# Patient Record
Sex: MALE | Race: WHITE
[De-identification: names, ages, dates, MRNs, and addresses within clinical notes are randomized per-mention and may not be internally consistent; named-entity substitution may affect disease eponyms.]

---

## 2017-08-04 ENCOUNTER — HOSPITAL ENCOUNTER (EMERGENCY)
Dept: HOSPITAL 17 - PHED | Age: 67
Discharge: HOME | End: 2017-08-04
Payer: COMMERCIAL

## 2017-08-04 VITALS — BODY MASS INDEX: 22.63 KG/M2 | HEIGHT: 72 IN | WEIGHT: 167.11 LBS

## 2017-08-04 VITALS
TEMPERATURE: 98.2 F | OXYGEN SATURATION: 95 % | SYSTOLIC BLOOD PRESSURE: 161 MMHG | HEART RATE: 103 BPM | RESPIRATION RATE: 16 BRPM | DIASTOLIC BLOOD PRESSURE: 79 MMHG

## 2017-08-04 VITALS — SYSTOLIC BLOOD PRESSURE: 120 MMHG | DIASTOLIC BLOOD PRESSURE: 78 MMHG

## 2017-08-04 DIAGNOSIS — Z85.9: ICD-10-CM

## 2017-08-04 DIAGNOSIS — K62.5: Primary | ICD-10-CM

## 2017-08-04 DIAGNOSIS — I10: ICD-10-CM

## 2017-08-04 LAB
APTT BLD: 34.1 SEC (ref 24.3–30.1)
BASOPHILS # BLD AUTO: 0.3 TH/MM3 (ref 0–0.2)
BASOPHILS NFR BLD: 2.9 % (ref 0–2)
EOSINOPHIL # BLD: 0.2 TH/MM3 (ref 0–0.4)
EOSINOPHIL NFR BLD: 1.3 % (ref 0–4)
ERYTHROCYTE [DISTWIDTH] IN BLOOD BY AUTOMATED COUNT: 13 % (ref 11.6–17.2)
HCT VFR BLD CALC: 45.4 % (ref 39–51)
HEMO FLAGS: (no result)
INR PPP: 1 RATIO
LYMPHOCYTES # BLD AUTO: 1.3 TH/MM3 (ref 1–4.8)
LYMPHOCYTES NFR BLD AUTO: 10.7 % (ref 9–44)
MCH RBC QN AUTO: 31.9 PG (ref 27–34)
MCHC RBC AUTO-ENTMCNC: 33.4 % (ref 32–36)
MCV RBC AUTO: 95.3 FL (ref 80–100)
MONOCYTES NFR BLD: 6.7 % (ref 0–8)
NEUTROPHILS # BLD AUTO: 9.2 TH/MM3 (ref 1.8–7.7)
NEUTROPHILS NFR BLD AUTO: 78.4 % (ref 16–70)
PLATELET # BLD: 200 TH/MM3 (ref 150–450)
PROTHROMBIN TIME: 11 SEC (ref 9.8–11.6)
RBC # BLD AUTO: 4.76 MIL/MM3 (ref 4.5–5.9)
WBC # BLD AUTO: 11.8 TH/MM3 (ref 4–11)

## 2017-08-04 PROCEDURE — 85025 COMPLETE CBC W/AUTO DIFF WBC: CPT

## 2017-08-04 PROCEDURE — 99283 EMERGENCY DEPT VISIT LOW MDM: CPT

## 2017-08-04 PROCEDURE — 85610 PROTHROMBIN TIME: CPT

## 2017-08-04 PROCEDURE — 80307 DRUG TEST PRSMV CHEM ANLYZR: CPT

## 2017-08-04 PROCEDURE — 85730 THROMBOPLASTIN TIME PARTIAL: CPT

## 2017-08-04 NOTE — PD
HPI


Chief Complaint:  GI Complaint


Time Seen by Provider:  13:58


Travel History


International Travel<30 days:  No


Contact w/Intl Traveler<30days:  No


Traveled to known affect area:  No





History of Present Illness


HPI


This patient complains of rectal bleeding.  Duration 2 days.  Severity is mild.

  He describes a red liquid bleeding.  No melena.  He has no prior history of 

GI bleed.  Never had a colonoscopy.  Up until recently he was alcoholic 

drinking heavily.  No alleviating factors.





PFSH


Past Medical History


Autoimmune Disease:  No


Cancer:  Yes


Chemotherapy:  No


Hypertension:  Yes


Psychiatric:  No


Reproductive:  Yes (PENILE PUMP IMPLANT)


Radiation Therapy:  No





Past Surgical History


AICD:  No


Body Medical Devices:  PENILE IMPLANT


Genitourinary Surgery:  Yes


Pacemaker:  No


Other Surgery:  Yes (PROSTATE CA HX/REMOVAL  AND KIDNEY TUBE REPAIR)





Social History


Alcohol Use:  No


Tobacco Use:  No


Substance Use:  No





Allergies-Medications


(Allergen,Severity, Reaction):  


Coded Allergies:  


     Codeine (Verified  Allergy, Severe, rash, 8/4/17)


Reported Meds & Prescriptions





Reported Meds & Active Scripts


Active


Active Prescriptions or Reported Medications Unobtainable    








Review of Systems


General / Constitutional:  No: Fever


Eyes:  No: Visual changes


HENT:  No: Headaches


Cardiovascular:  No: Chest Pain or Discomfort


Respiratory:  No: Shortness of Breath


Gastrointestinal:  Positive: Hematochezia,  No: Abdominal Pain


Genitourinary:  No: Dysuria


Musculoskeletal:  No: Pain


Skin:  No Rash


Neurologic:  No: Weakness


Psychiatric:  No: Depression


Endocrine:  No: Polydipsia


Hematologic/Lymphatic:  No: Easy Bruising





Physical Exam


Narrative


GENERAL: Well-nourished, well-developed patient in no apparent distress.  He is 

very hard of hearing


SKIN: Focused skin assessment reveals no rash and nodules. Skin is Warm and dry.


HEAD: Atraumatic. Normocephalic. 


EYES: Pupils equal and round. No scleral icterus. No injection or drainage. 


ENT: No nasal bleeding or discharge.  Mucous membranes pink and moist.


NECK: Trachea midline. No JVD. 


CARDIOVASCULAR: Regular rate and rhythm.  No murmur appreciated.


RESPIRATORY: No accessory muscle use. Clear to auscultation. Breath sounds 

equal bilaterally. 


GASTROINTESTINAL: Abdomen soft, non-tender, nondistended. Hepatic and splenic 

margins not palpable. 


MUSCULOSKELETAL: No obvious deformities. No clubbing.  No cyanosis.  No edema. 


NEUROLOGICAL: Awake and alert. No obvious cranial nerve deficits.  Motor 

grossly within normal limits. Normal speech.


PSYCHIATRIC: Appropriate mood and affect; insight and judgment normal.


Rectal: No fissure or external hemorrhoid





Data


Data


Last Documented VS





Vital Signs








  Date Time  Temp Pulse Resp B/P Pulse Ox O2 Delivery O2 Flow Rate FiO2


 


8/4/17 13:38 98.2 103 16 161/79 95   








Orders








 Iv Access Insert/Monitor (8/4/17 14:11)


Complete Blood Count With Diff (8/4/17 14:11)


Prothrombin Time / Inr (Pt) (8/4/17 14:11)


Act Partial Throm Time (Ptt) (8/4/17 14:11)


Alcohol (Ethanol) (8/4/17 14:11)








Labs








 Laboratory Tests








Test 8/4/17





 14:32


 


White Blood Count 11.8 TH/MM3


 


Red Blood Count 4.76 MIL/MM3


 


Hemoglobin 15.2 GM/DL


 


Hematocrit 45.4 %


 


Mean Corpuscular Volume 95.3 FL


 


Mean Corpuscular Hemoglobin 31.9 PG


 


Mean Corpuscular Hemoglobin 33.4 %





Concent 


 


Red Cell Distribution Width 13.0 %


 


Platelet Count 200 TH/MM3


 


Mean Platelet Volume 8.4 FL


 


Neutrophils (%) (Auto) 78.4 %


 


Lymphocytes (%) (Auto) 10.7 %


 


Monocytes (%) (Auto) 6.7 %


 


Eosinophils (%) (Auto) 1.3 %


 


Basophils (%) (Auto) 2.9 %


 


Neutrophils # (Auto) 9.2 TH/MM3


 


Lymphocytes # (Auto) 1.3 TH/MM3


 


Monocytes # (Auto) 0.8 TH/MM3


 


Eosinophils # (Auto) 0.2 TH/MM3


 


Basophils # (Auto) 0.3 TH/MM3


 


CBC Comment DIFF FINAL 


 


Differential Comment  


 


Prothrombin Time 11.0 SEC


 


Prothromb Time International 1.0 RATIO





Ratio 


 


Activated Partial 34.1 SEC





Thromboplast Time 


 


Ethyl Alcohol Level 0 MG/DL














MDM


Medical Decision Making


Medical Screen Exam Complete:  Yes


Emergency Medical Condition:  Yes


Medical Record Reviewed:  Yes


Differential Diagnosis


Internal hemorrhoid, AV malformation, diverticulosis


Narrative Course


I have reviewed the patient's electronic medical record.  Has been seen here 

before with no prior blood work on file





IV placed


CBC is normal with hemoglobin of 15.2


Coagulation studies are normal


Alcohol level is negative





Patient is having rectal bleeding but stable.  He is not hypotensive and has a 

healthy hemoglobin of 15.2


He is minimally symptomatic





Recommend he follow-up with GI physician to discuss colonoscopy


If he clinically worsens he will return


Advised to avoid alcohol and aspirin and anti-inflammatories





Diagnosis





 Primary Impression:  


 Rectal bleeding





***Additional Instructions:


Follow-up with  GI physician


Avoid aspirin and anti-inflammatories and alcohol


Return for significant worsening


***Med/Other Pt SpecificInfo:  Other


Scripts


Unable to Obtain Active Prescriptions or Reported Meds


Disposition:  01 DISCHARGE HOME


Condition:  Stable








Adarsh Chou MD Aug 4, 2017 14:14

## 2017-08-23 ENCOUNTER — HOSPITAL ENCOUNTER (EMERGENCY)
Dept: HOSPITAL 17 - PHED | Age: 67
Discharge: HOME | End: 2017-08-23
Payer: COMMERCIAL

## 2017-08-23 VITALS
RESPIRATION RATE: 16 BRPM | HEART RATE: 81 BPM | DIASTOLIC BLOOD PRESSURE: 78 MMHG | OXYGEN SATURATION: 98 % | SYSTOLIC BLOOD PRESSURE: 114 MMHG

## 2017-08-23 VITALS
TEMPERATURE: 97.4 F | SYSTOLIC BLOOD PRESSURE: 153 MMHG | HEART RATE: 89 BPM | DIASTOLIC BLOOD PRESSURE: 70 MMHG | RESPIRATION RATE: 18 BRPM | OXYGEN SATURATION: 99 %

## 2017-08-23 VITALS
HEART RATE: 80 BPM | SYSTOLIC BLOOD PRESSURE: 139 MMHG | RESPIRATION RATE: 16 BRPM | DIASTOLIC BLOOD PRESSURE: 82 MMHG | OXYGEN SATURATION: 98 %

## 2017-08-23 VITALS — OXYGEN SATURATION: 98 % | RESPIRATION RATE: 16 BRPM

## 2017-08-23 VITALS — HEIGHT: 71 IN | WEIGHT: 160.94 LBS | BODY MASS INDEX: 22.53 KG/M2

## 2017-08-23 DIAGNOSIS — I10: ICD-10-CM

## 2017-08-23 DIAGNOSIS — Z96.0: ICD-10-CM

## 2017-08-23 DIAGNOSIS — K52.9: ICD-10-CM

## 2017-08-23 DIAGNOSIS — Z85.46: ICD-10-CM

## 2017-08-23 DIAGNOSIS — K62.5: Primary | ICD-10-CM

## 2017-08-23 LAB
ANION GAP SERPL CALC-SCNC: 11 MEQ/L (ref 5–15)
BASOPHILS # BLD AUTO: 0.2 TH/MM3 (ref 0–0.2)
BASOPHILS NFR BLD: 1.3 % (ref 0–2)
BUN SERPL-MCNC: 17 MG/DL (ref 7–18)
CHLORIDE SERPL-SCNC: 108 MEQ/L (ref 98–107)
EOSINOPHIL # BLD: 0.3 TH/MM3 (ref 0–0.4)
EOSINOPHIL NFR BLD: 1.5 % (ref 0–4)
ERYTHROCYTE [DISTWIDTH] IN BLOOD BY AUTOMATED COUNT: 14.4 % (ref 11.6–17.2)
GFR SERPLBLD BASED ON 1.73 SQ M-ARVRAT: 96 ML/MIN (ref 89–?)
HCO3 BLD-SCNC: 22.2 MEQ/L (ref 21–32)
HCT VFR BLD CALC: 48.3 % (ref 39–51)
HEMO FLAGS: (no result)
LYMPHOCYTES # BLD AUTO: 1.5 TH/MM3 (ref 1–4.8)
LYMPHOCYTES NFR BLD AUTO: 8.1 % (ref 9–44)
MCH RBC QN AUTO: 31.7 PG (ref 27–34)
MCHC RBC AUTO-ENTMCNC: 33 % (ref 32–36)
MCV RBC AUTO: 95.9 FL (ref 80–100)
MONOCYTES NFR BLD: 6.8 % (ref 0–8)
NEUTROPHILS # BLD AUTO: 14.8 TH/MM3 (ref 1.8–7.7)
NEUTROPHILS NFR BLD AUTO: 82.3 % (ref 16–70)
PLATELET # BLD: 164 TH/MM3 (ref 150–450)
POTASSIUM SERPL-SCNC: 3.6 MEQ/L (ref 3.5–5.1)
RBC # BLD AUTO: 5.03 MIL/MM3 (ref 4.5–5.9)
SODIUM SERPL-SCNC: 141 MEQ/L (ref 136–145)
WBC # BLD AUTO: 18 TH/MM3 (ref 4–11)

## 2017-08-23 PROCEDURE — 80048 BASIC METABOLIC PNL TOTAL CA: CPT

## 2017-08-23 PROCEDURE — 99284 EMERGENCY DEPT VISIT MOD MDM: CPT

## 2017-08-23 PROCEDURE — 85025 COMPLETE CBC W/AUTO DIFF WBC: CPT

## 2017-08-23 NOTE — PD
HPI


Chief Complaint:  GI Complaint


Time Seen by Provider:  09:54


Travel History


International Travel<30 days:  No


Contact w/Intl Traveler<30days:  No


Traveled to known affect area:  No





History of Present Illness


HPI


68 yo M c/o diarrhea x 3 days. + Bloody with liquid stool. Mild generalized 

abdominal pain reported. No fever. Onset gradual. Pt reports hx of daily 

alcohol consumption. No prior colonoscopy. No prior hx GI bleed.





PFSH


Past Medical History


Autoimmune Disease:  No


Cancer:  Yes


Cardiovascular Problems:  Yes


Chemotherapy:  No


Diminished Hearing:  Yes (VERY [place])


Gastrointestinal Disorders:  No


Hypertension:  Yes


Musculoskeletal:  No


Psychiatric:  No


Reproductive:  Yes (PENILE PUMP IMPLANT)


Respiratory:  No


Radiation Therapy:  No


Influenza Vaccination:  No





Past Surgical History


Abdominal Surgery:  No


AICD:  No


Appendectomy:  Yes


Body Medical Devices:  PENILE IMPLANT


Genitourinary Surgery:  Yes


Pacemaker:  No


Prostatectomy:  Yes


Thoracic Surgery:  No


Other Surgery:  Yes (PROSTATE CA HX/REMOVAL  AND KIDNEY TUBE REPAIR)





Social History


Alcohol Use:  Yes (2 DRINKS PER DAY)


Tobacco Use:  No


Substance Use:  No





Allergies-Medications


(Allergen,Severity, Reaction):  


Coded Allergies:  


     codeine (Unverified  Allergy, Severe, rash, 8/23/17)


Reported Meds & Prescriptions





Reported Meds & Active Scripts


Active


Flagyl (Metronidazole) 500 Mg Tab 500 Mg PO TID


Cipro (Ciprofloxacin HCl) 500 Mg Tab 500 Mg PO BID


Reported


[Blood Pressure Meds]   Unknown Dose  








Review of Systems


Except as stated in HPI:  all other systems reviewed are Neg


General / Constitutional:  No: Fever


Gastrointestinal:  Positive: Diarrhea, Other (bloody stool)





Physical Exam


Narrative


GENERAL: 68 yo M, WNWD, NAD


SKIN: Warm and dry.


HEAD: Atraumatic. Normocephalic. 


EYES: Pupils equal and round. No scleral icterus. No injection or drainage. 


ENT: No nasal bleeding or discharge.  Mucous membranes pink and moist.


NECK: Trachea midline. No JVD. 


CARDIOVASCULAR: Regular rate and rhythm.  


RESPIRATORY: No accessory muscle use. Clear to auscultation. Breath sounds 

equal bilaterally. 


GASTROINTESTINAL:Soft. No focus of tenderness.


MUSCULOSKELETAL: Extremities without clubbing, cyanosis, or edema. No obvious 

deformities. 


NEUROLOGICAL: Awake and alert. No obvious cranial nerve deficits.  Motor 

grossly within normal limits. Five out of 5 muscle strength in the arms and 

legs.  Normal speech.


PSYCHIATRIC: Appropriate mood and affect; insight and judgment normal.





Data


Data


Last Documented VS


97.4


80


16


138/82


Orders





 Orders


Basic Metabolic Panel (Bmp) (8/23/17 10:00)


Complete Blood Count With Diff (8/23/17 10:00)


Ecg Monitoring (8/23/17 10:00)


Iv Access Insert/Monitor (8/23/17 10:00)


Oximetry (8/23/17 10:00)


Sodium Chloride 0.9% Flush (Ns Flush) (8/23/17 10:00)


Mandatory Outpatient Referral (8/23/17 10:57)


Ciprofloxacin (Cipro) (8/23/17 11:15)


Metronidazole (Flagyl) (8/23/17 11:15)





Labs





Laboratory Tests








Test


  8/23/17


10:18


 


White Blood Count 18.0 TH/MM3 


 


Red Blood Count 5.03 MIL/MM3 


 


Hemoglobin 16.0 GM/DL 


 


Hematocrit 48.3 % 


 


Mean Corpuscular Volume 95.9 FL 


 


Mean Corpuscular Hemoglobin 31.7 PG 


 


Mean Corpuscular Hemoglobin


Concent 33.0 % 


 


 


Red Cell Distribution Width 14.4 % 


 


Platelet Count 164 TH/MM3 


 


Mean Platelet Volume 9.1 FL 


 


Neutrophils (%) (Auto) 82.3 % 


 


Lymphocytes (%) (Auto) 8.1 % 


 


Monocytes (%) (Auto) 6.8 % 


 


Eosinophils (%) (Auto) 1.5 % 


 


Basophils (%) (Auto) 1.3 % 


 


Neutrophils # (Auto) 14.8 TH/MM3 


 


Lymphocytes # (Auto) 1.5 TH/MM3 


 


Monocytes # (Auto) 1.2 TH/MM3 


 


Eosinophils # (Auto) 0.3 TH/MM3 


 


Basophils # (Auto) 0.2 TH/MM3 


 


CBC Comment DIFF FINAL 


 


Differential Comment  


 


Blood Urea Nitrogen 17 MG/DL 


 


Creatinine 0.80 MG/DL 


 


Random Glucose 95 MG/DL 


 


Calcium Level 8.6 MG/DL 


 


Sodium Level 141 MEQ/L 


 


Potassium Level 3.6 MEQ/L 


 


Chloride Level 108 MEQ/L 


 


Carbon Dioxide Level 22.2 MEQ/L 


 


Anion Gap 11 MEQ/L 


 


Estimat Glomerular Filtration


Rate 96 ML/MIN 


 











MDM


Medical Decision Making


Medical Screen Exam Complete:  Yes


Emergency Medical Condition:  Yes


Differential Diagnosis


Constipation, Gastritis, Acute Cholecystitis, Biliary Colic, Pancreatitis, MULLER

, Hepatitis, Bowel Obstruction, Cystitis, Mesenteric Ischemia, AAA, Appendicitis

, Renal Stone/Hydronephrosis, GERD, perforated viscous


Narrative Course


CBC & BMP Diagram


8/23/17 10:18








Calcium Level 8.6





The patient is resting comfortably and feels better, is alert and in no 

distress. The patients results and examination findings were discussed.  The 

repeat examination is unremarkable and benign. The history, exam, diagnostic 

testing, and current condition do not suggest any significant pathology to 

warrant further testing, continued ED treatment, admission, or surgical 

evaluation at this point. The vital signs have been stable. The patient does 

not have uncontrollable pain, intractable vomiting, or other significant 

symptoms. The patient's condition is stable and appropriate for discharge. The 

patient will pursue further outpatient evaluation with a primary care physician 

or other designated or consulting physician as indicated in the discharge 

instructions. The patient expressed understanding and was agreeable with this 

plan.





Diagnosis





 Primary Impression:  


 Rectal bleeding


 Additional Impression:  


 Colitis


Referrals:  


Ada Hebert MD


2 days





***Additional Instructions:  


You have a choice when it comes to health care, and we are glad that you chose 

"[facility]. Hopefully, we have met your expectations on today's visit.  You 

are welcome to return to "[facility] at any time, as we are committed to 

meeting the health care needs of our community.


***Med/Other Pt SpecificInfo:  Prescription(s) given


Scripts


Metronidazole (Flagyl) 500 Mg Tab


500 MG PO TID for Infection, #10 TAB 0 Refills


   Prov: Sukhwinder Currie MD         8/23/17 


Ciprofloxacin (Cipro) 500 Mg Tab


500 MG PO BID for Infection, #10 TAB 0 Refills


   Prov: Sukhwinder Currie MD         8/23/17


Disposition:  01 DISCHARGE HOME


Condition:  Stable











Sukhwinder Currie MD Aug 23, 2017 10:57

## 2018-02-21 ENCOUNTER — HOSPITAL ENCOUNTER (EMERGENCY)
Dept: HOSPITAL 17 - NEPE | Age: 68
Discharge: HOME | End: 2018-02-21
Payer: COMMERCIAL

## 2018-02-21 VITALS
HEART RATE: 48 BPM | TEMPERATURE: 96.4 F | RESPIRATION RATE: 16 BRPM | SYSTOLIC BLOOD PRESSURE: 106 MMHG | DIASTOLIC BLOOD PRESSURE: 52 MMHG | OXYGEN SATURATION: 96 %

## 2018-02-21 VITALS
HEART RATE: 44 BPM | RESPIRATION RATE: 16 BRPM | OXYGEN SATURATION: 96 % | DIASTOLIC BLOOD PRESSURE: 55 MMHG | SYSTOLIC BLOOD PRESSURE: 99 MMHG

## 2018-02-21 VITALS
SYSTOLIC BLOOD PRESSURE: 120 MMHG | DIASTOLIC BLOOD PRESSURE: 60 MMHG | HEART RATE: 52 BPM | RESPIRATION RATE: 16 BRPM | OXYGEN SATURATION: 98 %

## 2018-02-21 VITALS — SYSTOLIC BLOOD PRESSURE: 113 MMHG | RESPIRATION RATE: 18 BRPM | DIASTOLIC BLOOD PRESSURE: 109 MMHG

## 2018-02-21 VITALS — DIASTOLIC BLOOD PRESSURE: 57 MMHG | RESPIRATION RATE: 14 BRPM | SYSTOLIC BLOOD PRESSURE: 121 MMHG

## 2018-02-21 DIAGNOSIS — I95.1: Primary | ICD-10-CM

## 2018-02-21 DIAGNOSIS — H91.90: ICD-10-CM

## 2018-02-21 DIAGNOSIS — Z85.46: ICD-10-CM

## 2018-02-21 DIAGNOSIS — Z98.890: ICD-10-CM

## 2018-02-21 DIAGNOSIS — R94.31: ICD-10-CM

## 2018-02-21 LAB
BASOPHILS # BLD AUTO: 0.1 TH/MM3 (ref 0–0.2)
BASOPHILS NFR BLD: 1.3 % (ref 0–2)
BUN SERPL-MCNC: 30 MG/DL (ref 7–18)
CALCIUM SERPL-MCNC: 8.9 MG/DL (ref 8.5–10.1)
CHLORIDE SERPL-SCNC: 108 MEQ/L (ref 98–107)
CREAT SERPL-MCNC: 1.19 MG/DL (ref 0.6–1.3)
EOSINOPHIL # BLD: 0.1 TH/MM3 (ref 0–0.4)
EOSINOPHIL NFR BLD: 1.5 % (ref 0–4)
ERYTHROCYTE [DISTWIDTH] IN BLOOD BY AUTOMATED COUNT: 13.5 % (ref 11.6–17.2)
GFR SERPLBLD BASED ON 1.73 SQ M-ARVRAT: 61 ML/MIN (ref 89–?)
GLUCOSE SERPL-MCNC: 95 MG/DL (ref 74–106)
HCO3 BLD-SCNC: 25.4 MEQ/L (ref 21–32)
HCT VFR BLD CALC: 43.1 % (ref 39–51)
HGB BLD-MCNC: 14.6 GM/DL (ref 13–17)
LYMPHOCYTES # BLD AUTO: 2 TH/MM3 (ref 1–4.8)
LYMPHOCYTES NFR BLD AUTO: 20.5 % (ref 9–44)
MCH RBC QN AUTO: 31.4 PG (ref 27–34)
MCHC RBC AUTO-ENTMCNC: 33.8 % (ref 32–36)
MCV RBC AUTO: 93 FL (ref 80–100)
MONOCYTE #: 0.8 TH/MM3 (ref 0–0.9)
MONOCYTES NFR BLD: 8.1 % (ref 0–8)
NEUTROPHILS # BLD AUTO: 6.7 TH/MM3 (ref 1.8–7.7)
NEUTROPHILS NFR BLD AUTO: 68.6 % (ref 16–70)
PLATELET # BLD: 236 TH/MM3 (ref 150–450)
PMV BLD AUTO: 8.2 FL (ref 7–11)
RBC # BLD AUTO: 4.63 MIL/MM3 (ref 4.5–5.9)
SODIUM SERPL-SCNC: 138 MEQ/L (ref 136–145)
WBC # BLD AUTO: 9.7 TH/MM3 (ref 4–11)

## 2018-02-21 PROCEDURE — 84484 ASSAY OF TROPONIN QUANT: CPT

## 2018-02-21 PROCEDURE — 85025 COMPLETE CBC W/AUTO DIFF WBC: CPT

## 2018-02-21 PROCEDURE — 71046 X-RAY EXAM CHEST 2 VIEWS: CPT

## 2018-02-21 PROCEDURE — 80048 BASIC METABOLIC PNL TOTAL CA: CPT

## 2018-02-21 PROCEDURE — 93005 ELECTROCARDIOGRAM TRACING: CPT

## 2018-02-21 PROCEDURE — 99285 EMERGENCY DEPT VISIT HI MDM: CPT

## 2018-02-21 PROCEDURE — 96360 HYDRATION IV INFUSION INIT: CPT

## 2018-02-21 NOTE — PD
HPI


Chief Complaint:  General Weakness


Time Seen by Provider:  16:35


Travel History


International Travel<30 days:  No


Contact w/Intl Traveler<30days:  No


Traveled to known affect area:  No





History of Present Illness


HPI


Patient is a 67-year-old male who comes in because his doctor told him to come 

in because his blood pressure was low in his office.  He says he went for a 

routine checkup, and has not been having any issues.  He says he occasionally 

feels dizzy, but currently feels fine.  He denies any chest pain or shortness 

of breath.  He has not fallen.  He was told by his doctor to hold his 

metoprolol.  He says he would really like to go home.  Severity is mild.





PFSH


Past Medical History


Autoimmune Disease:  No


Cancer:  Yes (PROSTATE)


Cardiovascular Problems:  Yes


Chemotherapy:  No


Diminished Hearing:  Yes (VERY [place])


Gastrointestinal Disorders:  No


Hypertension:  Yes


Musculoskeletal:  No


Psychiatric:  No


Reproductive:  Yes (PENILE PUMP IMPLANT)


Respiratory:  No


Radiation Therapy:  No





Past Surgical History


Abdominal Surgery:  No


AICD:  No


Appendectomy:  Yes


Body Medical Devices:  PENILE IMPLANT


Genitourinary Surgery:  Yes


Pacemaker:  No


Prostatectomy:  Yes


Thoracic Surgery:  No


Other Surgery:  Yes (PROSTATE CA HX/REMOVAL  AND KIDNEY TUBE REPAIR)





Social History


Alcohol Use:  Yes (2 DRINKS PER DAY)


Tobacco Use:  No


Substance Use:  No





Allergies-Medications


(Allergen,Severity, Reaction):  


Coded Allergies:  


     codeine (Unverified  Allergy, Severe, rash, 8/23/17)


Reported Meds & Prescriptions





Reported Meds & Active Scripts


Active


Flagyl (Metronidazole) 500 Mg Tab 500 Mg PO TID


Cipro (Ciprofloxacin HCl) 500 Mg Tab 500 Mg PO BID


Reported


[Blood Pressure Meds]   Unknown Dose  








Review of Systems


Except as stated in HPI:  all other systems reviewed are Neg


General / Constitutional:  No: Fever, Chills


HENT:  No: Headaches, Lightheadedness


Cardiovascular:  No: Chest Pain or Discomfort


Respiratory:  No: Shortness of Breath


Gastrointestinal:  No: Nausea, Vomiting


Genitourinary:  No: Dysuria


Musculoskeletal:  No: Myalgias


Skin:  No Rash


Neurologic:  Positive: Dizziness, No: Weakness





Physical Exam


Narrative


GENERAL: Awake and alert, no acute distress.


SKIN: Focused skin assessment warm/dry.  No wounds or signs of infection.


HEAD: Atraumatic. Normocephalic. 


EYES: Pupils equal and round. No scleral icterus.  Extraocular movements intact.


ENT: Mucous membranes pink and moist.


NECK: Trachea midline. No JVD. 


CARDIOVASCULAR: Regular rate and rhythm.  No murmur appreciated.


RESPIRATORY: No accessory muscle use. Clear to auscultation. Breath sounds 

equal bilaterally. 


GASTROINTESTINAL: Abdomen soft, non-tender, nondistended. 


MUSCULOSKELETAL: No obvious deformities. No clubbing.  No cyanosis.  No edema. 


NEUROLOGICAL: Awake and alert. No obvious cranial nerve deficits.  Motor 

grossly within normal limits. Normal speech.


PSYCHIATRIC: Appropriate mood and affect; insight and judgment normal.





Data


Data


Last Documented VS





Vital Signs








  Date Time  Temp Pulse Resp B/P (MAP) Pulse Ox O2 Delivery O2 Flow Rate FiO2


 


2/21/18 18:23  57 16 113/66 (82)    





  57 18 121/69 (86)    





  67 18 158/109 (125)    





    Manual Cuff/Auscultation    


 


2/21/18 16:35      Room Air  


 


2/21/18 14:04     96   


 


2/21/18 14:02 96.4       








Orders





 Orders


Complete Blood Count With Diff (2/21/18 14:10)


Basic Metabolic Panel (Bmp) (2/21/18 14:10)


Electrocardiogram (2/21/18 )


Chest, Pa & Lat (2/21/18 )


Orthostatic Vital Signs (2/21/18 16:43)


Sodium Chlor 0.9% 1000 Ml Inj (Ns 1000 M (2/21/18 16:45)


Troponin I (2/21/18 16:45)





Labs





Laboratory Tests








Test


  2/21/18


15:22 2/21/18


16:50


 


White Blood Count 9.7 TH/MM3  


 


Red Blood Count 4.63 MIL/MM3  


 


Hemoglobin 14.6 GM/DL  


 


Hematocrit 43.1 %  


 


Mean Corpuscular Volume 93.0 FL  


 


Mean Corpuscular Hemoglobin 31.4 PG  


 


Mean Corpuscular Hemoglobin


Concent 33.8 % 


  


 


 


Red Cell Distribution Width 13.5 %  


 


Platelet Count 236 TH/MM3  


 


Mean Platelet Volume 8.2 FL  


 


Neutrophils (%) (Auto) 68.6 %  


 


Lymphocytes (%) (Auto) 20.5 %  


 


Monocytes (%) (Auto) 8.1 %  


 


Eosinophils (%) (Auto) 1.5 %  


 


Basophils (%) (Auto) 1.3 %  


 


Neutrophils # (Auto) 6.7 TH/MM3  


 


Lymphocytes # (Auto) 2.0 TH/MM3  


 


Monocytes # (Auto) 0.8 TH/MM3  


 


Eosinophils # (Auto) 0.1 TH/MM3  


 


Basophils # (Auto) 0.1 TH/MM3  


 


CBC Comment DIFF FINAL  


 


Differential Comment   


 


Blood Urea Nitrogen 30 MG/DL  


 


Creatinine 1.19 MG/DL  


 


Random Glucose 95 MG/DL  


 


Calcium Level 8.9 MG/DL  


 


Sodium Level 138 MEQ/L  


 


Potassium Level 5.4 MEQ/L  


 


Chloride Level 108 MEQ/L  


 


Carbon Dioxide Level 25.4 MEQ/L  


 


Anion Gap 5 MEQ/L  


 


Estimat Glomerular Filtration


Rate 61 ML/MIN 


  


 


 


Troponin I


  


  LESS THAN 0.02


NG/ML











MDM


Medical Decision Making


Medical Screen Exam Complete:  Yes


Emergency Medical Condition:  Yes


Medical Record Reviewed:  Yes


Interpretation(s)


ECG shows sinus bradycardia, no ST elevation or depression.


Differential Diagnosis


Dehydration versus medication issues versus  electrolyte abnormality


Narrative Course


Patient is a 67-year-old male who comes in because his doctor told him his 

blood pressure was low.  He is asymptomatic.  Blood pressure was taken with a 

proper sized cuff and was found to be normal.  He was originally orthostatic, 

he was given a liter of fluids and this improved.  He was already told to hold 

his metoprolol.  Labs show no acute abnormalities.  He would like to go home.  

He will be discharged to follow-up with his doctors.





Diagnosis





 Primary Impression:  


 Orthostatic hypotension


Patient Instructions:  Dehydration (ED), General Instructions, Hypotension (DC)





***Additional Instructions:  


Hold her blood pressure medication as directed.  Increase her water intake.  

Follow-up with your doctor.  Return to the ED as needed for any worsening 

symptoms.


Disposition:  01 DISCHARGE HOME


Condition:  Stable











Tamela Mcintyre MD Feb 21, 2018 19:00

## 2018-02-21 NOTE — RADRPT
EXAM DATE/TIME:  02/21/2018 14:21 

 

[facility] COMPARISON:     

No previous studies available for comparison.

 

                     

INDICATIONS :     

Chest discomfort; low blood pressure. 

                     

 

MEDICAL HISTORY :     

Hypertension.  Carcinoma, prostatic.        

 

SURGICAL HISTORY :     

None.   

 

ENCOUNTER:     

Initial                                        

 

ACUITY:     

1 day      

 

PAIN SCORE:     

1/10

 

LOCATION:     

Bilateral chest 

 

FINDINGS:     

PA and lateral views of the chest demonstrate the lungs to be symmetrically aerated without evidence 
of mass, infiltrate or effusion.  7 mm calcified granuloma anterior right midlung.  The cardiomediast
inal contours are unremarkable.  Osseous structures are intact.

 

CONCLUSION:     

1. Right lung calcified granuloma.

2. The lungs are otherwise clear.

 

 

 

 Benny Mckeon MD on February 21, 2018 at 15:03           

Board Certified Radiologist.

 This report was verified electronically.

## 2018-02-22 NOTE — EKG
Date Performed: 02/21/2018       Time Performed: 15:18:22

 

PTAGE:      67 years

 

EKG:      SINUS BRADYCARDIA POSSIBLE RIGHT VENTRICULAR CONDUCTION DELAY BORDERLINE ECG

 

PREVIOUS TRACING       : 04/28/2000 08.35 Since the prior tracing, there has been no significant cruz


 

DOCTOR:   Joan Ricketts  Interpretating Date/Time  02/22/2018 08:11:01